# Patient Record
Sex: FEMALE | NOT HISPANIC OR LATINO | Employment: UNEMPLOYED | URBAN - METROPOLITAN AREA
[De-identification: names, ages, dates, MRNs, and addresses within clinical notes are randomized per-mention and may not be internally consistent; named-entity substitution may affect disease eponyms.]

---

## 2019-10-22 ENCOUNTER — TRANSFERRED RECORDS (OUTPATIENT)
Dept: HEALTH INFORMATION MANAGEMENT | Facility: CLINIC | Age: 54
End: 2019-10-22

## 2019-10-22 LAB
CHOLESTEROL (EXTERNAL): 222 MG/DL (ref 0–200)
GLUCOSE (EXTERNAL): 105 MG/DL (ref 70–100)
HDLC SERPL-MCNC: 52 MG/DL (ref 35–60)
LDL CHOLESTEROL CALCULATED (EXTERNAL): 160.6 MG/DL (ref 0–130)
TRIGLYCERIDES (EXTERNAL): 47 MG/DL (ref 30–150)
TSH SERPL-ACNC: 0.66 UIU/ML (ref 0.34–4.82)

## 2019-10-25 ENCOUNTER — TRANSFERRED RECORDS (OUTPATIENT)
Dept: HEALTH INFORMATION MANAGEMENT | Facility: CLINIC | Age: 54
End: 2019-10-25

## 2019-10-28 ENCOUNTER — TRANSFERRED RECORDS (OUTPATIENT)
Dept: HEALTH INFORMATION MANAGEMENT | Facility: CLINIC | Age: 54
End: 2019-10-28

## 2020-05-05 ENCOUNTER — TRANSFERRED RECORDS (OUTPATIENT)
Dept: HEALTH INFORMATION MANAGEMENT | Facility: CLINIC | Age: 55
End: 2020-05-05

## 2020-12-30 ENCOUNTER — TRANSFERRED RECORDS (OUTPATIENT)
Dept: HEALTH INFORMATION MANAGEMENT | Facility: CLINIC | Age: 55
End: 2020-12-30

## 2020-12-31 ENCOUNTER — TRANSFERRED RECORDS (OUTPATIENT)
Dept: HEALTH INFORMATION MANAGEMENT | Facility: CLINIC | Age: 55
End: 2020-12-31

## 2021-09-14 ENCOUNTER — TELEPHONE (OUTPATIENT)
Dept: TRANSPLANT | Facility: CLINIC | Age: 56
End: 2021-09-14

## 2021-09-14 ENCOUNTER — TELEPHONE (OUTPATIENT)
Dept: TRANSPLANT | Facility: CLINIC | Age: 56
End: 2021-09-14
Payer: COMMERCIAL

## 2021-09-14 NOTE — TELEPHONE ENCOUNTER
Initial Independent Living Donor Advocate contact made with potential donor today.  I introduced myself and my role during the donation process, includin.  NICHOLAS ROLE   The federal government requires that all licensed transplant centers provide the living donor with an Independent Living Donor Advocate (NICHOLAS).  I do not meet recipients or attend meetings that discuss their care or decision to transplant them. My role is separate to avoid any conflict of interest.  My role is to ensure:  1) your rights are protected;  2) you get all the information you need from the transplant team to make a fully informed decision whether to donate;   3) that living donation is in your best interest.   4) that you have the right to decide NOT to go forward with living donation at any time during this process.  I am available to you throughout the workup, during surgery phase and follow-up at home.   2. WORKUP & PRIVACY     Your identity and workup are not shared with the recipient at any time.     There is a medical donor workup that consists of testing to determine if you are healthy enough to donate.  Workup tests include many blood draws, urine collection/ (kidney function testing), chest x-ray, EKG, CT scan. As you complete each step then you may move on to the next.  Workup can take as little or as long as you need and you can stop the process at any time.     Transplant is a treatment option, not a cure. A kidney from a living kidney donor can last 12-14 years.  Other treatment options are  donation and two types of dialysis.     This is major surgery and your estimated hospital stay is approximately 1-2 nights.  After surgery, there are driving and lifting restrictions - no driving for two weeks and no lifting over ten pounds for 6 - 8 weeks.  Donors are routinely off from work for 4 - 6 weeks after surgery, and potentially longer if they have a physical job.       If you anticipate lost wages due to donation,  donor wage reimbursement options may be available to you and will be reviewed with you during the evaluation process.      The recipient's insurance covers the medical expenses related to the donor evaluation and surgery.  However, it is important for you to carry your own health insurance to address any medical issues that are found and are NOT related to living donation.  3.  QUESTIONS  Have you received a packet from the transplant department?     Questions?    Have you discussed with anyone your potential decision to donate?   Yes.  Is anyone pressuring or coercing you to donate? No.  Have you discussed any financial arrangements with recipient around donating a kidney? No.  Are you aware that you can confidentially opt out at any time, up to and including day of donation? Yes.  At this time, would you like to proceed with the medical evaluation to see if you can be a kidney donor?  Yes.    If yes, the donor coordinator will be reaching out to you with next steps.     You can reach me or someone else on the NICHOLAS team by calling [phone] Option 3.    NICHOLAS NOTES: Ayala is scheduled to talk to Amee French RN, at 1:00 pm on 9/28/21    Duration of call 25 minutes

## 2021-09-14 NOTE — TELEPHONE ENCOUNTER
"SSN: [id number]  Voucher: Opted-In Registered As: Directed Donor  Donor Intake Start: 21 Donor Intake Complete: 21  Gender: Female Preferred Language: English  Full Name: Dariana Fowler Is  Needed: [not answered]  E-mail: [email or web address] Phone Number: [phone]  Secondary Phone:  Contact Preference: [not answered] Best Contact Time: 9am - 5pm  Emergency Contact: Gino Magallanes Emergency Contact #: [phone]  Relationship to Contact: Contact is my spouse  : 61 Age: 56  Address: [address] City: [place]  State: [place] Postal Code: [id number]  Height: 5'8\" Weight: 150lbs  BMI: 22.8  Employment Status: Other Has PTO for donation? [not answered]  Occupation: [not answered] Requires Heavy Lifting? [not answered]  Education Level: Four Year Degree Marital Status:   Exercise Routine: Daily Health Insurance: No  Blood Type: B Ethnicity/Race: White  Donor Type: Directed Donor  Prefer Remote Donation: [not answered]  Physician: Dr. Andersen<br/>[place], [place]  Donating for Local Recipient  Recipient's Name: Sergey Bailey Recipient's : [birth year]  Recipient's Status: Patient on dialysis. How Candidate Knows Recip: Friend  Candidate communicates w/  Recip: Every Day  Possible Interest In:  Motivation to donate: My friend needs a kidney now  Living Donor Pre-Screening  Is In [place]  Will accept blood transfusions? Yes  Has been diagnosed with kidney disease? No  Has had a heart attack? No  Has Diabetes (High BGs)? No  Has had cancer? No  Has had kidney stones? No  Has ever been pregnant? Yes   - Is Currently Pregnant? No   - Months Since Pregnant? 24+   - Is Currently Nursing? No   - Had gestational diabetes? No   - Hypertension during pregnancy? Never  Is Planning on Pregnancy? No  Is Taking Birth Control? No  Has Used Tobacco? Yes   - currently uses tobacco: Yes   - will stop for surgery: Yes   - how many years: 45   - tobacco use (packs/cans) / frequency: 3 / Weekly  Has " HIV? [not answered]  Is Currently Incarcerated? No  Is Currently Residing in [place]  History Misc  Has Allergies? Yes  Allergy  Molds  Has had Surgeries? Yes  Surgery When  Hysterectomy 1999  Anurism 2003  Finger 2018  Ankle lump 2008  Takes Medication? No  Medical History  History of High BP? Treated in past  History of CABG (bypass surgery)? No  History of blood clots? Never  History of coronary disease? Never  History of high cholesterol or triglycerides? Treated in past  Has stents implanted? No  History of chest pain during exercise? No  History of chest pain at other times? No  Results of climbing 2 flights of stairs? Unknown  Had stress test in last year? No  Has had stroke? No  Has had leg bypass? No  History of lung disease? Never  History of COPD? Never  History of TB? Never  History of Pneumonia? Never  Has respiratory issues? No  Has HIV? [not answered]  Has Gastro Issues? No  History of Gallstones? Never  History of Pancreatitis? Never  History of Liver Disease? Never  History of Hepatitis B? Never  History of Hepatitis C? Never  History of bleeding problems? Never  History of UTIs? Yes   - UTI episodes: 1   - years since last UTI: 15+ years  History of kidney damage? Never  History of Proteinuria? Unknown  History of Hematuria? Never  History of neuro disease? Never  History of seizure? Never  History of lupus? Never  History of paralysis? Never  History of arthritis? Never  History of neuropathy? Never  History of depression? Treated in past  History of anxiety? Treated in past  History of documented psychiatric illness? Treated in past   - description:  Went through a divorce 20  years ago it was a tough time.  Suffered situational depression  and anxiety.  History of Fibroid Uterus? Treated in past  History of Endometriosis? Treated in past  History of Polycystic Ovaries? Unknown  Has had Miscarriages? No  Has had abortions? No  Has had transfusions? No  History of Obesity? No  History of Fabry's  Disease? No  History of Sickle Cell Disease? No  History of Sickle Cell Trait? No  History of Sarcoidosis? No  Has auto-immune disease? No  Has had Physical Exam? Yes   - how many years ago: 4  Has had Mammogram? Yes   - how many years ago: 4  Has had Pap Smear? Yes   - how many years ago: 10  Colonoscopy? Yes   - how many years ago: 10  Medical history comments? I have coils in my head from prior brain anurisms but I dont feel it should be a factor. That was  in 2003 I had a full hysterectomy like 20 years or so ago  Living Donor Family Medical History  Anyone with kidney disease? No  Anyone with liver disease? No  Anyone with heart disease? No  Anyone with coronary artery disease? Yes   - which family members: Maternal GrandMother  Anyone with high blood pressure? No  Anyone with blood disorder? No  Anyone with cancer? Yes   - which family members: Mother, Maternal Grandfather.  Paternal Grandfather, Uncle  Anyone with kidney cancer? No  Anyone with diabetes? Yes   - which family members: My nephew  Is mother alive? No  Mother's age? 42  Mother's cause of death? Uteral and Lymph node cancer  Is father alive? Yes  Father's age? 85  How many siblings? 2  How many adult children? 2  How many children under 18? 0  Social History  Has Used Alcohol? Yes   - currently uses alcohol: No   - how much: 1/Monthly  Has Abused Alcohol? No  Has Used Drugs? No  Has had legal issues w/ law enforcement? No  Traveled over 100 miles from home in last year? Yes   - Traveled Where? Up to [facility] [place]  Has had suicidal thoughts or attempts in the last five years? No

## 2021-09-28 ENCOUNTER — TELEPHONE (OUTPATIENT)
Dept: TRANSPLANT | Facility: CLINIC | Age: 56
End: 2021-09-28

## 2021-09-28 DIAGNOSIS — Z00.5 TRANSPLANT DONOR EVALUATION: Primary | ICD-10-CM

## 2021-09-28 NOTE — TELEPHONE ENCOUNTER
Contacted Dariana Fowler to introduce myself and my role, review of medical/surgical/family history and next steps.     Dariana Fowler  is aware She can stop donor evaluation at any time.    Have you ever been positive for COVID 19?     Have you received the COVID vaccination?  If yes, when and what brand?      Dariana Fowler is a 56 year old female  ABO unknown that would like to learn more about donation to a friend.     Concerns from medical/surgical/family history: brain aneurysm with coils.     Reviewed any history of travel in endemic areas:   Strongyloides- [place], [place] and [place].  Trypanosoma cruzi ([place]ag[place])- [place]  West Nile Virus- [place], [place], [place], [place] and [place].     Per our Phase 1 algorithm, does meet criteria to do preliminary testing.     Confirmed that Ayala reviewed Informed consent document and all questions answered.  Reviewed that they will receive Docusign to obtain electronic signature for the following: Informed consent, SRTR data, YUDITH for medical information, Auth for Electronic communication and will need their signed consent back before proceeding with evaluation.      Encouraged sign up for MyChart and confirmed [facility] sign up.    Verified recipient status if not NDD.    Donor timeline: TBD-before [place] if possible     Will send orders to scheduling team to set up for phase 1 testing.

## 2021-10-15 ENCOUNTER — TELEPHONE (OUTPATIENT)
Dept: TRANSPLANT | Facility: CLINIC | Age: 56
End: 2021-10-15

## 2021-10-15 NOTE — TELEPHONE ENCOUNTER
Most of labs in but will be entered into system. No abo. Called lab where [facility] [place] at [phone].Lab stated abo was NOT done--they didn't have that order--orders for labs were only on 1 page.Now LM for Ayala to see if BP's,ht and wt done since those were also not sent here.

## 2021-11-01 ENCOUNTER — TELEPHONE (OUTPATIENT)
Dept: TRANSPLANT | Facility: CLINIC | Age: 56
End: 2021-11-01

## 2021-11-01 NOTE — TELEPHONE ENCOUNTER
Called Ayala to see if she got her BP, height/weight done because we didn't get the results besides the lab work. I called [facility] and they are sending over results.

## 2021-11-09 ENCOUNTER — DOCUMENTATION ONLY (OUTPATIENT)
Dept: TRANSPLANT | Facility: CLINIC | Age: 56
End: 2021-11-09
Payer: COMMERCIAL

## 2021-11-09 ENCOUNTER — TELEPHONE (OUTPATIENT)
Dept: TRANSPLANT | Facility: CLINIC | Age: 56
End: 2021-11-09
Payer: COMMERCIAL

## 2021-11-09 NOTE — PROGRESS NOTES
"BP's:110/60,112/60,110/60 Ht= 5' 9\" Qi=245#--BMI=23.1.[facility] [place] [place] [facility] #[phone].  "

## 2021-11-10 NOTE — TELEPHONE ENCOUNTER
Reviewed any history of travel in endemic areas:   Strongyloides- [place] and [place].  Trypanosoma cruzi ([place])- [place]  West Nile Virus- [place], [place], [place], [place] and [place].     Reviewed preliminary lab tests.     Reviewed evaluation testing: Covid PCR, Iohexol, Lab work, CXR, EKG, Provider visits and functions, CT Angiogram.     Reviewed operations of selection committee and angio review meetings and the need for multidisciplinary input.     Reviewed [facility] listing and transfer of care to [facility] team if approved. Provided Ayala with [facility] website to review.     Briefly went over options if approved of NDD, SVP and FVP.     Ayala would like to proceed to evaluation on 12/16/21 if possible with Iohexol on 12/16/21 and COVID PCR prior to Iohexol.     Confirmed that Ayala reviewed Informed consent document and all questions answered.  Reviewed that they will receive Docusign to obtain electronic signature for the following: Informed consent, SRTR data, YUDITH for medical information, Auth for Electronic communication and will need their signed consent back before proceeding with evaluation.      Encouraged sign up for MyChart and confirmed [facility] sign up.    Verified recipient status if not NDD.    Donor timeline: TBD     Will send orders to scheduling team to set up for evaluation testing.

## 2021-11-11 DIAGNOSIS — Z00.5 TRANSPLANT DONOR EVALUATION: Primary | ICD-10-CM

## 2021-11-11 RX ORDER — METHYLPREDNISOLONE SODIUM SUCCINATE 125 MG/2ML
125 INJECTION, POWDER, LYOPHILIZED, FOR SOLUTION INTRAMUSCULAR; INTRAVENOUS
Status: CANCELLED
Start: 2021-12-16

## 2021-11-11 RX ORDER — DIPHENHYDRAMINE HYDROCHLORIDE 50 MG/ML
50 INJECTION INTRAMUSCULAR; INTRAVENOUS
Status: CANCELLED
Start: 2021-12-16

## 2021-11-11 RX ORDER — ALBUTEROL SULFATE 0.83 MG/ML
2.5 SOLUTION RESPIRATORY (INHALATION)
Status: CANCELLED | OUTPATIENT
Start: 2021-12-16

## 2021-11-11 RX ORDER — EPINEPHRINE 1 MG/ML
0.3 INJECTION, SOLUTION, CONCENTRATE INTRAVENOUS EVERY 5 MIN PRN
Status: CANCELLED | OUTPATIENT
Start: 2021-12-16

## 2021-11-11 RX ORDER — ALBUTEROL SULFATE 90 UG/1
1-2 AEROSOL, METERED RESPIRATORY (INHALATION)
Status: CANCELLED
Start: 2021-12-16

## 2021-11-11 RX ORDER — MEPERIDINE HYDROCHLORIDE 25 MG/ML
25 INJECTION INTRAMUSCULAR; INTRAVENOUS; SUBCUTANEOUS EVERY 30 MIN PRN
Status: CANCELLED | OUTPATIENT
Start: 2021-12-16

## 2021-11-11 RX ORDER — NALOXONE HYDROCHLORIDE 0.4 MG/ML
0.2 INJECTION, SOLUTION INTRAMUSCULAR; INTRAVENOUS; SUBCUTANEOUS
Status: CANCELLED | OUTPATIENT
Start: 2021-12-16

## 2021-12-16 ENCOUNTER — OFFICE VISIT (OUTPATIENT)
Dept: INFUSION THERAPY | Facility: CLINIC | Age: 56
End: 2021-12-16
Attending: INTERNAL MEDICINE

## 2021-12-16 ENCOUNTER — TELEPHONE (OUTPATIENT)
Dept: TRANSPLANT | Facility: CLINIC | Age: 56
End: 2021-12-16

## 2021-12-16 DIAGNOSIS — Z53.9 ERRONEOUS ENCOUNTER--DISREGARD: Primary | ICD-10-CM

## 2021-12-16 DIAGNOSIS — Z00.5 TRANSPLANT DONOR EVALUATION: ICD-10-CM

## 2021-12-16 NOTE — LETTER
12/16/2021         RE: Dariana Fowler   [address]  [place] [address]        Dear Colleague,    Thank you for referring your patient, Dariana Fowler, to the [facility]. Please see a copy of my visit note below.      This encounter was opened in error. Please disregard.      Again, thank you for allowing me to participate in the care of your patient.        Sincerely,        [facility]

## 2021-12-16 NOTE — LETTER
Date:January 7, 2022      Provider requested that no letter be sent. Do not send.       [facility]

## 2021-12-16 NOTE — TELEPHONE ENCOUNTER
Attempted to call Ayala to see if she was coming to her eval day and her voicemail was full.  Will try to call again tomorrow.